# Patient Record
Sex: MALE | Race: WHITE | NOT HISPANIC OR LATINO | URBAN - NONMETROPOLITAN AREA
[De-identification: names, ages, dates, MRNs, and addresses within clinical notes are randomized per-mention and may not be internally consistent; named-entity substitution may affect disease eponyms.]

---

## 2020-06-25 ENCOUNTER — OFFICE VISIT (OUTPATIENT)
Dept: SURGERY | Facility: CLINIC | Age: 50
End: 2020-06-25

## 2020-06-25 VITALS
HEART RATE: 63 BPM | RESPIRATION RATE: 16 BRPM | BODY MASS INDEX: 35.62 KG/M2 | TEMPERATURE: 98.2 F | HEIGHT: 72 IN | DIASTOLIC BLOOD PRESSURE: 73 MMHG | SYSTOLIC BLOOD PRESSURE: 126 MMHG | WEIGHT: 263 LBS | OXYGEN SATURATION: 98 %

## 2020-06-25 DIAGNOSIS — K21.9 GASTROESOPHAGEAL REFLUX DISEASE, ESOPHAGITIS PRESENCE NOT SPECIFIED: Primary | ICD-10-CM

## 2020-06-25 PROCEDURE — 99203 OFFICE O/P NEW LOW 30 MIN: CPT | Performed by: SURGERY

## 2020-06-25 RX ORDER — ESOMEPRAZOLE MAGNESIUM 10 MG/1
10 GRANULE, FOR SUSPENSION, EXTENDED RELEASE ORAL
COMMUNITY

## 2020-06-25 RX ORDER — FLUTICASONE PROPIONATE 50 MCG
2 SPRAY, SUSPENSION (ML) NASAL DAILY
COMMUNITY
Start: 2020-06-12

## 2020-06-25 RX ORDER — LOSARTAN POTASSIUM 25 MG/1
25 TABLET ORAL DAILY
COMMUNITY
Start: 2020-04-28

## 2020-06-25 RX ORDER — ROSUVASTATIN CALCIUM 10 MG/1
TABLET, COATED ORAL
COMMUNITY
Start: 2020-04-27

## 2020-06-25 NOTE — PROGRESS NOTES
6/25/2020    Patient Information  [address] [place] [address]  1970  [phone] (home)       Chief Complaint  Chief Complaint   Patient presents with   • Consult     Referred fo Reflux       HPI  Patient is a 49-year-old white male who is referred by Dr. Funk.  He has been diagnosed with laryngeal pharyngeal reflux.  He also has symptomatology suggestive of gastroesophageal reflux disease and has been taking PPIs.  He says he has been having gastrointestinal problems since he returned from [facility] in approximately 1991.  [facility] [place]  He reports he is not sure what they showed.    Past Medical History  Past Medical History:   Diagnosis Date   • Sleep apnea        Past Surgical History  Past Surgical History:   Procedure Laterality Date   • BACK SURGERY     • LAPAROSCOPIC CHOLECYSTECTOMY     • TONSILLECTOMY         Family History  Family History   Problem Relation Age of Onset   • No Known Problems Mother    • Hypertension Father    • Diabetes Father        Social History  Social History     Socioeconomic History   • Marital status: Unknown     Spouse name: Not on file   • Number of children: Not on file   • Years of education: Not on file   • Highest education level: Not on file   Tobacco Use   • Smoking status: Never Smoker   • Smokeless tobacco: Never Used   Substance and Sexual Activity   • Alcohol use: Never     Frequency: Never   • Drug use: Never   • Sexual activity: Defer       Current Medications  Current Outpatient Medications   Medication Sig Dispense Refill   • esomeprazole (nexIUM) 10 MG packet Take 10 mg by mouth Every Morning Before Breakfast.     • fluticasone (FLONASE) 50 MCG/ACT nasal spray 2 sprays Daily.     • losartan (COZAAR) 25 MG tablet Take 25 mg by mouth Daily.     • rosuvastatin (CRESTOR) 10 MG tablet TAKE 1 TABLET BY MOUTH ONCE DAILY AT BEDTIME FOR 90 DAYS       No current facility-administered medications for  "this visit.        Allergies  Patient has no known allergies.      Review of Systems    The Past medical history, family history, social history, medication list, allergies, and Review of Systems has been reviewed and confirmed.    General: weight gain  Integumentary: negative  Eyes: eyes itch, discharge from eyes  ENT: negative  Respiratory: negative  Gastrointestinal: heartburn and diarrhea  Cardiovascular: palpitations  Neurological: headaches  Psychiatric: negative  Hematologic/Lymphatic: negative  Genitourinary: negative  Musculoskeletal: painful joints and back pain  Endocrine: negative  Breasts: negative      Vitals:   Vitals:    06/25/20 1410   BP: 126/73   Pulse: 63   Resp: 16   Temp: 98.2 °F (36.8 °C)   SpO2: 98%     Body mass index is 35.67 kg/m².      06/25/20  1410   Weight: 119 kg (263 lb)     182.9 cm (72\")    Physical Exam  Physical Exam  General 49-year-old white male no acute distress.  Morbidly obese    Assessment   Laryngeal pharyngeal reflux  Gastroesophageal reflux disease.    I have discussed in detail with the patient surgical and medical treatment of this problem.  I have also explained to him that if he interested in pursuing a possible surgery that further work-up is included starting with an EGD and pH study.  I also discussed appropriate statistics related to the procedure and new data which is out in reference to PPIs and their long-term effects.  I have used anatomical pictures to explain how the procedure is performed and the rationale behind the treatment.  I discussed starting with a EGD and pH study and he reports he wants to think more about this before he proceeds.  He is also encouraged me [facility] which we will proceed today.    Plan  I have made an appointment for him to return in 2 weeks so we will have appointed time to have gotten [facility].  I gave him the booklet on gastroesophageal reflux disease to read and also encouraged him to go to YouTube " and the Internet to get further educated on this problem.  I asked him to cancel the 2-week appointment if he decides not to proceed any further.          This document signed by Kwabena Bowman MD June 25, 2020 14:23